# Patient Record
Sex: FEMALE | Employment: FULL TIME | URBAN - METROPOLITAN AREA
[De-identification: names, ages, dates, MRNs, and addresses within clinical notes are randomized per-mention and may not be internally consistent; named-entity substitution may affect disease eponyms.]

---

## 2017-01-07 ENCOUNTER — TELEPHONE (OUTPATIENT)
Dept: NURSING | Facility: CLINIC | Age: 32
End: 2017-01-07

## 2017-01-07 ENCOUNTER — OFFICE VISIT (OUTPATIENT)
Dept: URGENT CARE | Facility: URGENT CARE | Age: 32
End: 2017-01-07
Payer: COMMERCIAL

## 2017-01-07 VITALS
RESPIRATION RATE: 16 BRPM | DIASTOLIC BLOOD PRESSURE: 80 MMHG | OXYGEN SATURATION: 99 % | HEART RATE: 84 BPM | SYSTOLIC BLOOD PRESSURE: 120 MMHG | TEMPERATURE: 98.2 F | WEIGHT: 140 LBS

## 2017-01-07 DIAGNOSIS — L27.0 ALLERGIC DRUG RASH: Primary | ICD-10-CM

## 2017-01-07 DIAGNOSIS — J01.90 ACUTE SINUSITIS WITH SYMPTOMS > 10 DAYS: ICD-10-CM

## 2017-01-07 PROCEDURE — 99213 OFFICE O/P EST LOW 20 MIN: CPT | Performed by: PHYSICIAN ASSISTANT

## 2017-01-07 RX ORDER — SULFAMETHOXAZOLE/TRIMETHOPRIM 800-160 MG
1 TABLET ORAL 2 TIMES DAILY
COMMUNITY

## 2017-01-07 RX ORDER — AZITHROMYCIN 250 MG/1
TABLET, FILM COATED ORAL
Qty: 6 TABLET | Refills: 0 | Status: SHIPPED | OUTPATIENT
Start: 2017-01-07

## 2017-01-07 RX ORDER — PREDNISONE 20 MG/1
40 TABLET ORAL DAILY
Qty: 12 TABLET | Refills: 0 | Status: SHIPPED | OUTPATIENT
Start: 2017-01-07 | End: 2017-01-13

## 2017-01-08 NOTE — PROGRESS NOTES
SUBJECTIVE:  Jesusita Hansen is a 31 year old female who presents to the clinic today for a rash.  Onset of rash was 1 day(s) ago.   Rash is gradual onset, still present and worsening.  Location of the rash: spread over entire body.  Quality/symptoms of rash: itching and redness   Symptoms are moderate and severe and rash seems to be worsening.  Previous history of a similar rash? No  Recent exposure history: On day 7 on Bactrim for sinus infection.  Started to get mild rash last night.  Took dose today and got worse  Sinus pain improving.    Associated symptoms include:  Denies all sx of throat tightness, wheezing, cough, tongue/lip swelling and shortness of breath.    No past medical history on file.  Current Outpatient Prescriptions   Medication Sig Dispense Refill     sulfamethoxazole-trimethoprim (BACTRIM DS/SEPTRA DS) 800-160 MG per tablet Take 1 tablet by mouth 2 times daily       predniSONE (DELTASONE) 20 MG tablet Take 2 tablets (40 mg) by mouth daily for 6 days 12 tablet 0     azithromycin (ZITHROMAX) 250 MG tablet Two tablets first day, then one tablet daily for four days. 6 tablet 0     Social History   Substance Use Topics     Smoking status: Never Smoker      Smokeless tobacco: Not on file     Alcohol Use: Not on file       ROS:  Review of systems negative except as stated above.    EXAM:   /80 mmHg  Pulse 84  Temp(Src) 98.2  F (36.8  C) (Oral)  Resp 16  Wt 140 lb (63.504 kg)  SpO2 99%  GENERAL: alert, no acute distress.  SKIN: Rash description:    Distribution: generalized  Location: scattered over entire body    Color: red,  Lesion type: macular, round, blotchy, confluent with hector with pressure  GENERAL APPEARANCE: healthy, alert and no distress  EYES: EOMI,  PERRL, conjunctiva clear  HENT: ear canals and TM's normal.  Nose and mouth without ulcers, erythema or lesions  NECK: supple, non-tender to palpation, no adenopathy noted  RESP: lungs clear to auscultation - no rales, rhonchi  or wheezes  CV: regular rates and rhythm, normal S1 S2, no murmur noted    ASSESSMENT:  Drug eruption  Acute sinusitis with symptoms > 10 days [J01.90]    PLAN:  1) stop Bactrim and placed on allergy list.  Prednisone as directed and to continue with Benadryl.  Red flag signs reviewed and needs to RTC if sx worsen.  Also Zithromax give to take once reaction clears if sinus still bothering her.    2) Follow-up with primary clinic if not improving

## 2017-01-08 NOTE — TELEPHONE ENCOUNTER
Call Type: Triage Call    Presenting Problem: Patient calling with c/o possible allergic  reaction to antibiotic being taken for sinus infection, itchy rash  over entire body that has gotten worse. Denies respiratory symptoms,  or swelling of throat or tongue. Has taken Benadryl twice and using  Hydrocortisone cream with no relief of symptoms at all.  Triage Note:  Guideline Title: Allergic Reaction, Severe  Recommended Disposition: See Provider within 8 Hours  Original Inclination: Wanted to speak with a nurse  Override Disposition:  Intended Action: Go to Urgent Care Center  Physician Contacted: No  First occurrence of mild allergic reaction (rash; hives; itching; nasal  congestion; watery, red eyes) that occurs within minutes to several hours after  exposure to an allergen AND without any other signs or symptoms of anaphylaxis. ?  YES  Severe breathing problems ? NO  Breathing problems ? NO  New or worsening signs and symptoms that may indicate shock ? NO  Sudden change in mental status ? NO  Signs/symptoms of anaphylaxis ? NO  Suspected exposure to known allergen AND sudden onset cramping, abdominal pain  with nausea, vomiting or diarrhea. ? NO  History of severe reaction after previous similar exposure to known allergen ? NO  Physician Instructions:  Care Advice: Call provider if symptoms worsen or new symptoms develop.  Apply cool compresses for 15 to 20 minutes 4 to 6 times a day to relieve  discomfort.  Wear loose-fitting, non-restricting clothing.  Cool/tepid showers or baths may help relieve itching.  If cool water alone  does not relieve itching, try adding 1/2 to 1 cup baking soda or colloidal  oatmeal (Aveeno) to bath water.  Should not be alone for the next 24 hours in case symptoms worsen.  Avoid allergy triggers that have caused any reaction.  Read labels on food  or medications carefully  ask about ingredients in food when eating out.  If allergic to stinging  insects, wear long-sleeved shirts and  trousers and closed toe shoes.  Do  not walk barefoot.  Ask healthcare provider about carrying emergency  allergy medication.  Rash from a drug reaction may not appear for a week or more after a drug  was started.  Call provider if rash gets worse after offending drug has been  discontinued, or if it continues for more than a week with no improvement.  IMMEDIATE ACTION  CAUTIONS  Speak with provider before next dose of medication is due.  HEALTH PROMOTION / MAINTENANCE  SYMPTOM / CONDITION MANAGEMENT  Medication Advice: - Discontinue all nonprescription and alternative  medications, especially stimulants, until evaluated by provider. - Take  prescribed medications as directed, following label instructions for the  medication. - Do not change medications or dosing regimen until provider is  consulted. - Know possible side effects of medication and what to do if  they occur. - Tell provider all prescription, nonprescription or  alternative medications that you take  Call  if develop signs and symptoms of anaphylaxis within minutes to  several hours of exposure: severe difficulty breathing  rapid, weak or irregular pulse  pruritus, urticaria, swelling of face, lips, tongue, or throat causing  tightness or difficulty swallowing  abdominal cramping, nausea, vomiting or diarrhea.  If an allergy is identified, tell all healthcare providers of your allergy.  Even if a first-time reaction caused mild symptoms, a future response to  the same allergen may cause more serious symptoms.  Wear medical  identification to alert others in case of an emergency.  If immediately available, consider taking an appropriate dose of a  nonprescription antihistamine (e.g., Benadryl) orally as directed by the  label or a pharmacist. These medications may cause drowsiness and should be  taken with caution by adults 75 years or older.

## 2017-01-08 NOTE — NURSING NOTE
Jesusita Cherelle Tyrone is a 31 year old female.      Chief Complaint   Patient presents with     Urgent Care     Derm Problem     pt is here for a rash that started yesterday am om her leg and then started to spread - on an abx for about a week now for a sinus infection did not take yesterday but did take today - pt is breast feeding baby is 10 months       Initial /80 mmHg  Pulse 84  Temp(Src) 98.2  F (36.8  C) (Oral)  Resp 16  Wt 140 lb (63.504 kg)  SpO2 99% There is no height on file to calculate BMI.    BP completed using cuff size: regular    Questioned patient about current smoking habits.  Pt. has never smoked.      Cherelle Dee CMA